# Patient Record
Sex: MALE | Race: WHITE
[De-identification: names, ages, dates, MRNs, and addresses within clinical notes are randomized per-mention and may not be internally consistent; named-entity substitution may affect disease eponyms.]

---

## 2018-09-16 NOTE — PHYS DOC
Past Medical History


Past Medical History:  Anxiety, GERD, Hypertension


Additional Past Medical Histor:  irritable bowel, blood clots (peripheral 

artery disease)


Past Surgical History:  No Surgical History


Alcohol Use:  Occasionally


Drug Use:  None





Adult General


Chief Complaint


Chief Complaint:  SWALLOWED FORIEGN BODY





HPI


HPI





Patient is a 53  year old male who presents with complaining of steak stuck in 

his throat for the last 3 days. States he had history of GERD and usually when 

he eats steak or chicken he has problems with his doctor in his throat that 

usually resolves spontaneously but since Friday  ate steak and piece of meat 

stuck in his throat  that does not go down and he is not able to swallow food 

or liquids. Patient states sometimes he is able to swallow some liquids and 

sometimes is not able to swallow and complaining of pain in his throat because 

he tried to clear his throat frequently because he has foamy mucus coming out 

of his throat. Patient denies fever and chills, chest pain, generalized 

weakness.





Review of Systems


Review of Systems





Constitutional: Denies fever or chills []


Eyes: Denies change in visual acuity, redness, or eye pain []


HENT: Denies nasal congestion or sore throat []


Respiratory: Denies cough or shortness of breath []


Cardiovascular: No additional information not addressed in HPI []


GI: Denies abdominal pain, nausea, vomiting, bloody stools or diarrhea []


: Denies dysuria or hematuria []


Musculoskeletal: Denies back pain or joint pain []


Integument: Denies rash or skin lesions []


Neurologic: Denies headache, focal weakness or sensory changes []


Endocrine: Denies polyuria or polydipsia []





All other systems were reviewed and found to be within normal limits, except as 

documented in this note.





Current Medications


Current Medications





Current Medications








 Medications


  (Trade)  Dose


 Ordered  Sig/Jayy  Start Time


 Stop Time Status Last Admin


Dose Admin


 


 Glucagon


  (Glucagen)  1 mg  1X  ONCE  9/16/18 13:00


 9/16/18 13:05 DC 9/16/18 13:30


1 MG


 


 Multi-Ingredient


 Mouthwash/Gargle


  (Gi Cocktail)  20 ml  1X  ONCE  9/16/18 12:00


 9/16/18 12:01 DC 9/16/18 12:06


20 ML


 


 Propofol  20 ml @ As


 Directed  STK-MED ONCE  9/16/18 14:42


 9/16/18 14:43 DC  





 


 Ringer's Solution  1,000 ml @ 


 50 mls/hr  Q20H  9/16/18 13:39


 9/17/18 01:38   





 


 Sodium Chloride  1,000 ml @ 


 1,000 mls/hr  1X  ONCE  9/16/18 14:45


 9/16/18 15:44   














Allergies


Allergies





Allergies








Coded Allergies Type Severity Reaction Last Updated Verified


 


  No Known Drug Allergies    9/16/18 No











Physical Exam


Physical Exam





Constitutional: Well developed, well nourished, mild distress, non-toxic 

appearance. []


HENT: Normocephalic, atraumatic, oropharynx moist, no oral exudates, nose 

normal. []


Eyes: PERRLA, EOMI, conjunctiva normal, no discharge. [] 


Neck: Normal range of motion, no tenderness, supple, no stridor. [] 


Cardiovascular:Heart rate regular rhythm, no murmur []


Lungs & Thorax:  Bilateral breath sounds clear to auscultation []


Abdomen: Bowel sounds normal, soft, no tenderness, no masses, no pulsatile 

masses. [] 


Skin: Warm, dry, no erythema, no rash. [] 


Back: No tenderness, no CVA tenderness. [] 


Extremities: No tenderness, no cyanosis, no clubbing, ROM intact, no edema. [] 


Neurologic: Alert and oriented X 3, normal motor function, normal sensory 

function, no focal deficits noted. []


Psychologic: Affect anxious, judgement normal, mood normal. []





Current Patient Data


Vital Signs





 Vital Signs








  Date Time  Temp Pulse Resp B/P (MAP) Pulse Ox O2 Delivery O2 Flow Rate FiO2


 


9/16/18 14:56 99.0 90 20  96   





 99.0       


 


9/16/18 11:45    130/85 (100)  Room Air  








Lab Values





 Laboratory Tests








Test


 9/16/18


13:12


 


White Blood Count


 14.3 x10^3/uL


(4.0-11.0)  H


 


Red Blood Count


 5.33 x10^6/uL


(4.30-5.70)


 


Hemoglobin


 16.2 g/dL


(13.0-17.5)


 


Hematocrit


 46.9 %


(39.0-53.0)


 


Mean Corpuscular Volume


 88 fL ()





 


Mean Corpuscular Hemoglobin 30 pg (25-35)  


 


Mean Corpuscular Hemoglobin


Concent 35 g/dL


(31-37)


 


Red Cell Distribution Width


 13.9 %


(11.5-14.5)


 


Platelet Count


 340 x10^3/uL


(140-400)


 


Neutrophils (%) (Auto) 83 % (31-73)  H


 


Lymphocytes (%) (Auto) 12 % (24-48)  L


 


Monocytes (%) (Auto) 5 % (0-9)  


 


Eosinophils (%) (Auto) 1 % (0-3)  


 


Basophils (%) (Auto) 1 % (0-3)  


 


Neutrophils # (Auto)


 11.8 x10^3uL


(1.8-7.7)  H


 


Lymphocytes # (Auto)


 1.7 x10^3/uL


(1.0-4.8)


 


Monocytes # (Auto)


 0.7 x10^3/uL


(0.0-1.1)


 


Eosinophils # (Auto)


 0.1 x10^3/uL


(0.0-0.7)


 


Basophils # (Auto)


 0.1 x10^3/uL


(0.0-0.2)


 


Sodium Level


 142 mmol/L


(136-145)


 


Potassium Level


 4.2 mmol/L


(3.5-5.1)


 


Chloride Level


 103 mmol/L


()


 


Carbon Dioxide Level


 24 mmol/L


(21-32)


 


Anion Gap 15 (6-14)  H


 


Blood Urea Nitrogen


 35 mg/dL


(8-26)  H


 


Creatinine


 1.1 mg/dL


(0.7-1.3)


 


Estimated GFR


(Cockcroft-Gault) 70.3  





 


Glucose Level


 111 mg/dL


(70-99)  H


 


Calcium Level


 9.5 mg/dL


(8.5-10.1)





 Laboratory Tests


9/16/18 13:12








 Laboratory Tests


9/16/18 13:12














EKG


EKG


[]





Radiology/Procedures


Radiology/Procedures


[]





Course & Med Decision Making


Course & Med Decision Making


Pertinent Labs and Imaging studies reviewed. (See chart for details)





Evaluation of patient in ER showed 53-year-old male patient with complaining of 

foreign body stuck in throat for the last 3 days and able to swallow some 

liquids sometimes. Has unremarkable physical exam except for mild anxiety. 

Patient was not able to swallow GI cocktail and soda and GI on-call Dr. Waters 

was informed at 1259 and recommended to keep patient NPO for 2 hours and she 

plans to come to emergency room for endoscopic removal of foreign body.





1500: Patient was started on IV fluids and glucagon without change of his 

condition. Labs showed marked dehydration. Anesthesiologist presented to ER at 

1440 and rated the patient. GI lab staff presented to ER and took patient to GI 

lab. Patient was discharged from emergency room care.





Dragon Disclaimer


Dragon Disclaimer


This electronic medical record was generated, in whole or in part, using a 

voice recognition dictation system.





Departure


Departure


Impression:  


 Primary Impression:  


 Esophageal foreign body


 Additional Impressions:  


 Dehydration


 Anxiety


Disposition:  01 HOME, SELF-CARE (Transferred  to GI lab at 1445)


Condition:  STABLE


Referrals:  


MARTITA REED MD (PCP)


Patient Instructions:  Swallowed Foreign Body, Adult





Additional Instructions:  


Follow-up with your GI specialist orders after endoscopy





Problem Qualifiers











ANASTASIIA SINGH MD Sep 16, 2018 13:47

## 2018-09-16 NOTE — RAD
Portable chest, 9/16/2018:

 

HISTORY: Feeling of something stuck in throat

 

The heart size is normal. The lungs are clear. There is no evidence of 

pleural fluid. No radiopaque foreign body is evident in the chest. There 

is mild bowing of the azygos esophageal stripe near the GE junction level.

A small hiatal hernia could give this appearance.

 

IMPRESSION: No acute cardiopulmonary abnormality is detected.

 

Electronically signed by: Rick Moritz, MD (9/16/2018 12:21 PM) [facility]

## 2018-09-16 NOTE — CONS
DATE OF CONSULTATION:  09/16/2018



REQUESTING PHYSICIAN:  Dr. Martita Jean.



PRIMARY CARE PHYSICIAN:  Dr. Martita Jean.



REASON FOR CONSULTATION:  Esophageal food bolus.



HISTORY OF PRESENT ILLNESS:  This is a 53-year-old gentleman who reports that he

ate steak at 8:30 on 09/14/2018.  He has been unable to swallow and manage his

saliva since then.  He has had a pain.  He was seen in the Emergency Room where

he received a GI cocktail and _____.  He was unable to tolerate these and

vomited up the liquid.  He does take omeprazole for GERD and does report

occasional dysphagia, which spontaneously resolves within 20 minutes.



PAST MEDICAL HISTORY:

1.  Hypertension.

2.  Anxiety.

3.  Hyperlipidemia.

4.  GERD.



MEDICATIONS:

1.  Xanax.

2.  Simvastatin.

3.  Lisinopril.

4.  Omeprazole.

5.  Temazepam.



SOCIAL HISTORY:  He denies tobacco or IV drug abuse.  He is a recovering

alcoholic.  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



FAMILY MEDICAL HISTORY:  No colorectal cancer.



REVIEW OF SYSTEMS:  A 13-point review of systems was done.  It is positive as

per HPI and otherwise negative.



PHYSICAL EXAMINATION:

VITAL SIGNS:  His vital signs are stable, he is afebrile.

GENERAL:  He is a well-developed, well-nourished  male, in no apparent

distress.

HEENT:  He has got poor dentition.  His oropharynx is clear.

CARDIOVASCULAR:  S1, S2.

LUNGS:  Clear.

ABDOMEN:  Normoactive bowel sounds, soft, nontender, nondistended.

EXTREMITIES:  No edema.

NEUROLOGIC:  Awake, alert, oriented x 3.



ASSESSMENT AND PLAN:

1.  Esophageal food bolus:  The risks and benefits of upper endoscopy including

bleeding, perforation, non-diagnosis and sedation have been explained and he has

agreed to proceed.

2.  Colorectal cancer screening:  He denies any family history of colon cancer

and has not had a colonoscopy.



Thank you for allowing me to participate in the care of this patient.

 



______________________________

BLAIR ROSS MD



DR:  BRIE/greg  JOB#:  [id number] / [id number]

DD:  09/16/2018 15:27  DT:  09/16/2018 22:11



MARTITA Mullins MD

## 2018-11-27 NOTE — PHYS DOC
Past Medical History


Past Medical History:  No Pertinent History


Additional Past Medical Histor:  irritable bowel, blood clots (peripheral 

artery disease)


Past Surgical History:  No Surgical History


Alcohol Use:  None


Drug Use:  None





Adult General


Chief Complaint


Chief Complaint:  MECHANICAL FALL





HPI


HPI





Patient is a 53  year old [f__sex] who presents with []





Review of Systems


Review of Systems





Constitutional: Denies fever or chills []


Eyes: Denies change in visual acuity, redness, or eye pain []


HENT: Denies nasal congestion or sore throat []


Respiratory: Denies cough or shortness of breath []


Cardiovascular: No additional information not addressed in HPI []


GI: Denies abdominal pain, nausea, vomiting, bloody stools or diarrhea []


: Denies dysuria or hematuria []


Musculoskeletal: Denies back pain or joint pain []


Integument: Denies rash or skin lesions []


Neurologic: Denies headache, focal weakness or sensory changes []


Endocrine: Denies polyuria or polydipsia []





All other systems were reviewed and found to be within normal limits, except as 

documented in this note.





Current Medications


Current Medications





Current Medications








 Medications


  (Trade)  Dose


 Ordered  Sig/Jayy  Start Time


 Stop Time Status Last Admin


Dose Admin


 


 Etomidate


  (Amidate)  10 mg  1X  ONCE  11/27/18 21:00


 11/27/18 21:01 DC 11/27/18 21:52


10 MG


 


 Fentanyl Citrate


  (Fentanyl 2ml


 Vial)  100 mcg  1X  ONCE  11/27/18 21:00


 11/27/18 21:01 DC 11/27/18 21:53


100 MCG


 


 Ketamine HCl  100 mg  1X  ONCE  11/27/18 22:30


 11/27/18 22:31   





 


 Oxycodone/


 Acetaminophen


  (Percocet 5/325)  1 tab  1X  ONCE  11/27/18 23:00


 11/27/18 23:01   





 


 Sodium Chloride  1,000 ml @ 


 1,000 mls/hr  1X  ONCE  11/27/18 20:45


 11/27/18 21:44 DC 11/27/18 20:44


1,000 MLS/HR











Allergies


Allergies





Allergies








Coded Allergies Type Severity Reaction Last Updated Verified


 


  No Known Drug Allergies    9/16/18 No











Physical Exam


Physical Exam





Constitutional: Well developed, well nourished, no acute distress, non-toxic 

appearance. []


HENT: Normocephalic, atraumatic, bilateral external ears normal, oropharynx 

moist, no oral exudates, nose normal. []


Eyes: PERRLA, EOMI, conjunctiva normal, no discharge. [] 


Neck: Normal range of motion, no tenderness, supple, no stridor. [] 


Cardiovascular:Heart rate regular rhythm, no murmur []


Lungs & Thorax:  Bilateral breath sounds clear to auscultation []


Abdomen: Bowel sounds normal, soft, no tenderness, no masses, no pulsatile 

masses. [] 


Skin: Warm, dry, no erythema, no rash. [] 


Back: No tenderness, no CVA tenderness. [] 


Extremities: No tenderness, no cyanosis, no clubbing, ROM intact, no edema. [] 


Neurologic: Alert and oriented X 3, normal motor function, normal sensory 

function, no focal deficits noted. []


Psychologic: Affect normal, judgement normal, mood normal. []





Current Patient Data


Vital Signs





 Vital Signs








  Date Time  Temp Pulse Resp B/P (MAP) Pulse Ox O2 Delivery O2 Flow Rate FiO2


 


11/27/18 21:53   16  96 Room Air  


 


11/27/18 20:30 98.6 74  149/84 (105)    





 98.6       











EKG


EKG


[]





Radiology/Procedures


Radiology/Procedures


[]





Course & Med Decision Making


Course & Med Decision Making


Pertinent Labs and Imaging studies reviewed. (See chart for details)





[]





Dragon Disclaimer


Dragon Disclaimer


This electronic medical record was generated, in whole or in part, using a 

voice recognition dictation system.





Departure


Departure


Impression:  


 Primary Impression:  


 Dislocation of elbow, right, closed


Disposition:  01 HOME, SELF-CARE


Condition:  STABLE


Referrals:  


MARTITA REED MD (PCP)








LEILA MOLINA II, MD


Patient Instructions:  Elbow Dislocation, Sedation or General Anesthesia, Adult

, Care After, Sedation, Moderate, Adult


Scripts


Oxycodone/Apap 5-325 (PERCOCET 5-325 MG TABLET) 1 Each Tablet


0.5 TAB PO PRN Q6HRS PRN for PAIN, #14 TAB 0 Refills


   Prov: GERI COLBY          11/27/18





MODERATE SEDATION ASSESSMENT


RISKS/ALTERNATIVES


Risks/Alternatives


Risks and alternatives of this type of sedation and procedure discussed with:





H & P ON CHART


H & P


H & P on chart and reviewed for co-morbid conditions and appropriate labs.





MEDS/ALLERGIES REVIEWED


Meds/Allergies Reviewed


Medications and Allergies including time and route of recently administered 

narcotics and sedatives.





AIRWAY ASSESSMENT


Airway Assessment


Airway patency, oral function limitations, presence  of caps, crowns, dentures, 

partials, and ability to extend neck assessed.





Problem Qualifiers








 Primary Impression:  


 Dislocation of elbow, right, closed


 Encounter type:  initial encounter  Qualified Codes:  S53.104A - Unspecified 

dislocation of right ulnohumeral joint, initial encounter








GERI COLBY DO Nov 27, 2018 22:24

## 2018-11-28 NOTE — RAD
AP and lateral right elbow radiographs 11/27/2018

 

CLINICAL HISTORY: Fall with injury to the right elbow.

 

The radial head and proximal ulna are dislocated posteriorly. There 

appears to be a fracture of the coronoid process along with the right 

radial head. There is a large right elbow joint effusion.

 

IMPRESSION: Posterior dislocation of the right elbow with suspected 

fractures involving the coronoid process of the right ulna and the right 

radial head.

 

Electronically signed by: Dominick Manzo MD (11/28/2018 12:36 AM) [facility]

## 2018-11-28 NOTE — RAD
Two-view right elbow radiographs 11/27/2018

 

CLINICAL HISTORY: Post reduction of a dislocation of the right elbow.

 

AP and lateral digital radiographs of the right elbow were obtained. 

Comparison study is dated earlier same day. An external cast has been 

placed around the right elbow. The posterior dislocation of the right 

elbow has been reduced. A nondisplaced fracture of the right radial head 

is again seen.

 

IMPRESSION: Postreduction of the right elbow dislocation.

 

Electronically signed by: Dominick Manzo MD (11/28/2018 1:54 AM) [facility]

## 2019-01-15 NOTE — RAD
EXAM: CT RIGHT ELBOW

 

DATE: 1/15/2019 9:13 AM

 

COMPARISON: No prior

 

INDICATION:  History of right elbow dislocation post fall.

 

TECHNIQUE: CT of the right elbow was performed without IV contrast. Axial 

coronal and sagittal reformatted images were generated. In addition 3-D 

reformat images were also generated.

 

FINDINGS: 

Of note, given positioning constraints, evaluation is limited given 

associated quantum mottle.

 

There is a nondisplaced fracture of the radial head without significant 

cortical offset/irregularity or gap at the articular surface. Mild contour

abnormality of the coronoid process suggesting nondisplaced coronoid 

process fracture.

 

No elbow joint effusion. Normal muscle signal and bulk.

 

2-D AND 3-D reformat images redemonstrate  normal alignment of the elbow 

joint.

 

IMPRESSION:

1.  Nondisplaced fractures of the radial head and coronoid process

2.  Normal alignment of the radiohumeral and ulnohumeral joints.

 

Electronically signed by: Alonzo Smith MD (1/15/2019 2:46 PM) [facility]

## 2021-04-05 ENCOUNTER — HOSPITAL ENCOUNTER (OUTPATIENT)
Dept: HOSPITAL 61 - ER | Age: 56
Setting detail: OBSERVATION
LOS: 2 days | Discharge: HOME | End: 2021-04-07
Attending: FAMILY MEDICINE | Admitting: FAMILY MEDICINE
Payer: COMMERCIAL

## 2021-04-05 VITALS — HEIGHT: 74 IN | BODY MASS INDEX: 22.95 KG/M2 | WEIGHT: 178.79 LBS

## 2021-04-05 VITALS — DIASTOLIC BLOOD PRESSURE: 80 MMHG | SYSTOLIC BLOOD PRESSURE: 145 MMHG

## 2021-04-05 VITALS — SYSTOLIC BLOOD PRESSURE: 151 MMHG | DIASTOLIC BLOOD PRESSURE: 97 MMHG

## 2021-04-05 DIAGNOSIS — F10.129: ICD-10-CM

## 2021-04-05 DIAGNOSIS — I26.93: ICD-10-CM

## 2021-04-05 DIAGNOSIS — E78.00: ICD-10-CM

## 2021-04-05 DIAGNOSIS — I82.411: Primary | ICD-10-CM

## 2021-04-05 DIAGNOSIS — I10: ICD-10-CM

## 2021-04-05 DIAGNOSIS — F41.9: ICD-10-CM

## 2021-04-05 DIAGNOSIS — K76.0: ICD-10-CM

## 2021-04-05 DIAGNOSIS — Z87.891: ICD-10-CM

## 2021-04-05 DIAGNOSIS — I73.9: ICD-10-CM

## 2021-04-05 LAB
ALBUMIN SERPL-MCNC: 3.5 G/DL (ref 3.4–5)
ALBUMIN/GLOB SERPL: 1.1 {RATIO} (ref 1–1.7)
ALP SERPL-CCNC: 55 U/L (ref 46–116)
ALT SERPL-CCNC: 42 U/L (ref 16–63)
ANION GAP SERPL CALC-SCNC: 10 MMOL/L (ref 6–14)
AST SERPL-CCNC: 69 U/L (ref 15–37)
BASOPHILS # BLD AUTO: 0.1 X10^3/UL (ref 0–0.2)
BASOPHILS NFR BLD: 1 % (ref 0–3)
BILIRUB SERPL-MCNC: 1.9 MG/DL (ref 0.2–1)
BUN SERPL-MCNC: 10 MG/DL (ref 8–26)
BUN/CREAT SERPL: 13 (ref 6–20)
CALCIUM SERPL-MCNC: 8.5 MG/DL (ref 8.5–10.1)
CHLORIDE SERPL-SCNC: 93 MMOL/L (ref 98–107)
CO2 SERPL-SCNC: 31 MMOL/L (ref 21–32)
CREAT SERPL-MCNC: 0.8 MG/DL (ref 0.7–1.3)
EOSINOPHIL NFR BLD: 0.1 X10^3/UL (ref 0–0.7)
EOSINOPHIL NFR BLD: 1 % (ref 0–3)
ERYTHROCYTE [DISTWIDTH] IN BLOOD BY AUTOMATED COUNT: 13.8 % (ref 11.5–14.5)
GFR SERPLBLD BASED ON 1.73 SQ M-ARVRAT: 100.7 ML/MIN
GLUCOSE SERPL-MCNC: 127 MG/DL (ref 70–99)
HCT VFR BLD CALC: 43.6 % (ref 39–53)
HGB BLD-MCNC: 15.1 G/DL (ref 13–17.5)
LYMPHOCYTES # BLD: 1.5 X10^3/UL (ref 1–4.8)
LYMPHOCYTES NFR BLD AUTO: 11 % (ref 24–48)
MCH RBC QN AUTO: 30 PG (ref 25–35)
MCHC RBC AUTO-ENTMCNC: 35 G/DL (ref 31–37)
MCV RBC AUTO: 87 FL (ref 79–100)
MONO #: 1.1 X10^3/UL (ref 0–1.1)
MONOCYTES NFR BLD: 8 % (ref 0–9)
NEUT #: 11.1 X10^3/UL (ref 1.8–7.7)
NEUTROPHILS NFR BLD AUTO: 80 % (ref 31–73)
PLATELET # BLD AUTO: 100 X10^3/UL (ref 140–400)
POTASSIUM SERPL-SCNC: 3.2 MMOL/L (ref 3.5–5.1)
PROT SERPL-MCNC: 6.7 G/DL (ref 6.4–8.2)
PROTHROMBIN TIME: 14.6 SEC (ref 11.7–14)
RBC # BLD AUTO: 5.04 X10^6/UL (ref 4.3–5.7)
SODIUM SERPL-SCNC: 134 MMOL/L (ref 136–145)
WBC # BLD AUTO: 13.9 X10^3/UL (ref 4–11)

## 2021-04-05 PROCEDURE — 96372 THER/PROPH/DIAG INJ SC/IM: CPT

## 2021-04-05 PROCEDURE — G0379 DIRECT REFER HOSPITAL OBSERV: HCPCS

## 2021-04-05 PROCEDURE — 85610 PROTHROMBIN TIME: CPT

## 2021-04-05 PROCEDURE — 99285 EMERGENCY DEPT VISIT HI MDM: CPT

## 2021-04-05 PROCEDURE — 71275 CT ANGIOGRAPHY CHEST: CPT

## 2021-04-05 PROCEDURE — 93971 EXTREMITY STUDY: CPT

## 2021-04-05 PROCEDURE — G0378 HOSPITAL OBSERVATION PER HR: HCPCS

## 2021-04-05 PROCEDURE — 36415 COLL VENOUS BLD VENIPUNCTURE: CPT

## 2021-04-05 PROCEDURE — 80053 COMPREHEN METABOLIC PANEL: CPT

## 2021-04-05 PROCEDURE — 85025 COMPLETE CBC W/AUTO DIFF WBC: CPT

## 2021-04-05 RX ADMIN — HYDROCODONE BITARTRATE AND ACETAMINOPHEN PRN TAB: 5; 325 TABLET ORAL at 21:35

## 2021-04-05 RX ADMIN — TEMAZEPAM PRN MG: 15 CAPSULE ORAL at 21:34

## 2021-04-05 NOTE — ED.ADGEN
Past Medical History


Past Medical History:  Anxiety, GERD, High Cholesterol, Hypertension, Other


Additional Past Medical Histor:  irritable bowel, blood clots (peripheral artery

disease), INSOMNIA


Past Surgical History:  Angioplasty, Other


Smoking Status:  Former Smoker


Alcohol Use:  None


Drug Use:  None





General Adult


EDM:


Chief Complaint:  LOWER EXT PAIN





HPI:


HPI:





Patient is a 55  year old male who presents emergency department with complaints

of right lower extremity swelling and pain for the last 2 days.  Patient reports

that he is have a history of having a blood clot in his toe once and he almost 

lost his toe to the blood clot so he feels very anxious about being here today. 

Patient denies any use of anticoagulants.  He is any erythema, warmth, or injury

to his right leg.  Numbness, tingling, or decreased sensation to the affected 

extremity.  He denies any chest pain, shortness of breath, fever, cough, nausea,

vomiting, diarrhea, abdominal pain, or rash.  Patient denies any known exposure 

to COVID-19.  He currently rates pain 3 out of 10 on pain scale he denies any 

alleviating or exacerbating factors.





Review of Systems:


Review of Systems:


Complete ROS is negative unless otherwise noted in HPI.





Current Medications:





Current Medications








 Medications


  (Trade)  Dose


 Ordered  Sig/[facility]  Start Time


 Stop Time Status Last Admin


Dose Admin


 


 Alprazolam


  (Xanax)  1 mg  1X  ONCE  4/5/21 17:45


 4/5/21 17:46 DC 4/5/21 18:09


1 MG


 


 Enoxaparin Sodium


  (Lovenox 100mg


 Syringe)  90 mg  Q12HR  4/5/21 18:00


 4/5/21 20:30 DC 4/5/21 18:09


90 MG


 


 Enoxaparin Sodium


  (Lovenox Per


 Pharmacy


 Treatment Dosing)  1 each  PRN DAILY  PRN  4/5/21 17:45


 4/5/21 20:29 DC  





 


 Iohexol


  (Omnipaque 350


 Mg/ml)  100 ml  1X  ONCE  4/5/21 17:30


 4/5/21 17:31 DC 4/5/21 18:42


100 ML











Allergies:


Allergies:





Allergies








Coded Allergies Type Severity Reaction Last Updated Verified


 


  No Known Drug Allergies    9/16/18 No











Physical Exam:


PE:


See Above


Constitutional: Well developed, well nourished, no acute distress, non-toxic 

appearance, anxious. []


HENT: Normocephalic, atraumatic, bilateral external ears normal, nose normal. []


Eyes: PERRLA, EOMI, conjunctiva normal, no discharge. [] 


Neck: Normal range of motion, no stridor. [] 


Cardiovascular:Heart rate regular rhythm


Lungs & Thorax:  Respirations even and unlabored, no retractions, no respiratory

 distress


Abdomen: soft, no tenderness


Skin: Warm, dry, no erythema, no rash. [] 


Extremities: RLE: 2+ pedal pulse 2+ posterior tibial pulse, no bony tenderness, 

tenderness to palpation of the calf, cap refill less than 2 seconds, no cyan

osis, ROM intact, 1+ edema. [] 


Neurologic: Alert and oriented X 3, no focal deficits noted. []


Psychologic: Affect anxious, judgement normal, mood normal. []





Current Patient Data:


Labs:





                                Laboratory Tests








Test


 4/5/21


17:58


 


White Blood Count


 13.9 x10^3/uL


(4.0-11.0)  H


 


Red Blood Count


 5.04 x10^6/uL


(4.30-5.70)


 


Hemoglobin


 15.1 g/dL


(13.0-17.5)


 


Hematocrit


 43.6 %


(39.0-53.0)


 


Mean Corpuscular Volume


 87 fL ()





 


Mean Corpuscular Hemoglobin 30 pg (25-35)  


 


Mean Corpuscular Hemoglobin


Concent 35 g/dL


(31-37)


 


Red Cell Distribution Width


 13.8 %


(11.5-14.5)


 


Platelet Count


 100 x10^3/uL


(140-400)  L


 


Neutrophils (%) (Auto) 80 % (31-73)  H


 


Lymphocytes (%) (Auto) 11 % (24-48)  L


 


Monocytes (%) (Auto) 8 % (0-9)  


 


Eosinophils (%) (Auto) 1 % (0-3)  


 


Basophils (%) (Auto) 1 % (0-3)  


 


Neutrophils # (Auto)


 11.1 x10^3/uL


(1.8-7.7)  H


 


Lymphocytes # (Auto)


 1.5 x10^3/uL


(1.0-4.8)


 


Monocytes # (Auto)


 1.1 x10^3/uL


(0.0-1.1)


 


Eosinophils # (Auto)


 0.1 x10^3/uL


(0.0-0.7)


 


Basophils # (Auto)


 0.1 x10^3/uL


(0.0-0.2)


 


Prothrombin Time


 14.6 SEC


(11.7-14.0)  H


 


Prothrombin Time INR


 1.2 (0.8-1.1)


H


 


Sodium Level


 134 mmol/L


(136-145)  L


 


Potassium Level


 3.2 mmol/L


(3.5-5.1)  L


 


Chloride Level


 93 mmol/L


()  L


 


Carbon Dioxide Level


 31 mmol/L


(21-32)


 


Anion Gap 10 (6-14)  


 


Blood Urea Nitrogen


 10 mg/dL


(8-26)


 


Creatinine


 0.8 mg/dL


(0.7-1.3)


 


Estimated GFR


(Cockcroft-Gault) 100.7  





 


BUN/Creatinine Ratio 13 (6-20)  


 


Glucose Level


 127 mg/dL


(70-99)  H


 


Calcium Level


 8.5 mg/dL


(8.5-10.1)


 


Total Bilirubin


 1.9 mg/dL


(0.2-1.0)  H


 


Aspartate Amino Transferase


(AST) 69 U/L (15-37)


H


 


Alanine Aminotransferase (ALT)


 42 U/L (16-63)





 


Alkaline Phosphatase


 55 U/L


()


 


Total Protein


 6.7 g/dL


(6.4-8.2)


 


Albumin


 3.5 g/dL


(3.4-5.0)


 


Albumin/Globulin Ratio 1.1 (1.0-1.7)  





                                Laboratory Tests


4/5/21 17:58








                                Laboratory Tests


4/5/21 17:58











Vital Signs:





                                   Vital Signs








  Date Time  Temp Pulse Resp B/P (MAP) Pulse Ox O2 Delivery O2 Flow Rate FiO2


 


4/5/21 18:00  100  142/97 (112) 97 Room Air  


 


4/5/21 15:47   12     











EKG:


EKG:


[]





Heart Score:


C/O Chest Pain:  No


Risk Scores:


Score 0 - 3:  2.5% MACE over next 6 weeks - Discharge Home


Score 4 - 6:  20.3% MACE over next 6 weeks - Admit for Clinical Observation


Score 7 - 10:  72.7% MACE over next 6 weeks - Early Invasive Strategies





Radiology/Procedures:


Radiology/Procedures:


PROCEDURE: VENOUS LOWER EXTREMITY RIGHT





EXAMINATION:  US DPLX VENOUS EXTREMITY LOWER RT, 4/5/2021 4:48 PM





CLINICAL INDICATION:  Right calf pain and swelling, history of blood clot and





COMPARISON: None





PROCEDURE: Multiple grayscale, color Doppler and spectral Doppler sonographic 

images of the right lower extremity were obtained.





FINDINGS: There is occlusive thrombus in the right distal superficial femoral 

vein, popliteal vein, posterior tibial veins, and peroneal veins. The right 

common femoral, greater saphenous, and proximal to mid superficial femoral 

veinsare patent.





IMPRESSION: Positive exam for occlusive acute deep venous thrombosis in the 

right lower extremity from the distal superficial femoral vein through the calf 

veins.








**********FOR INTERNAL CODING PURPOSES**********





Critical result:





Findings discussed with the ordering physician on 4/5/2021 5:15 PM.





RESULT CODE: (C)  











PROCEDURE: CT ANGIOGRAPHY CHEST





Exam: CT of chest with contrast





INDICATION: DVT in the right lower extremity





TECHNIQUE: Sequential axial images through the chest obtained following the 

administration 100 mL of Omni 350 IV contrast. Sagittal and coronal reformatted 

images were reconstructed from the axial data and reviewed. 3-D reformatted 

images were reconstructed from the axial data and reviewed.





Comparisons: Ultrasound same day





FINDINGS:


Visualized portions of the thyroid are unremarkable. No enlarged mediastinal 

lymph nodes.





Heart size is normal. No pericardial effusion. Thoracic aorta has a normal 

course and caliber. Pulmonary artery is not enlarged. There is a pulmonary 

embolus noted within a subsegmental branch of the right lower lobe.





Airways are patent. No consolidation or pneumothorax. No suspicious lung 

nodules.





No pleural effusion or thickening.





Diffuse hepatic steatosis.





No suspicious osseous lesions or acute fractures.





IMPRESSION:


1.  Subsegmental pulmonary embolus within the right lower lobe. No evidence for 

right heart strain.


2.  Diffuse hepatic steatosis.








Exposure: One or more of the following in the visualized dose reduction 

techniques were utilized for this examination:


1.  Automated exposure control


2.  Adjustment of the MA and/or KV according to patient size


3.  Use of iterative of reconstructive technique





Electronically signed by: Devyn Sebastian MD (4/5/2021 7:07 PM) [facility]











  








[]





Course & Med Decision Making:


Course & Med Decision Making


Pertinent Labs and Imaging studies reviewed. (See chart for details)


[id number] with Dr. Reed who is the admitting physician, and care was assumed

 following discussion of patient.  Will admit patient as observation status for 

DVT of the right lower extremity.  I advised him of pending CT chest and labs 

that were ordered.


Patient's vital signs stable. Patient remains afebrile, appears nontoxic, 

respirations even and unlabored. Patient will be admitted to the medical 

surgical floor. Patient's case and plan of care also discussed with Dr. Acosta


[]





Jordyn Disclaimer:


Dragon Disclaimer:


This electronic medical record was generated, in whole or in part, using a voice

 recognition dictation system.





Departure


Departure


Referrals:  


MARTITA REED MD (PCP)











JIM MOORE        Apr 5, 2021 18:18

## 2021-04-05 NOTE — RAD
EXAMINATION:  US DPLX VENOUS EXTREMITY LOWER RT, 4/5/2021 4:48 PM



CLINICAL INDICATION:  Right calf pain and swelling, history of blood clot and



COMPARISON: None



PROCEDURE: Multiple grayscale, color Doppler and spectral Doppler sonographic images of the right low
er extremity were obtained.



FINDINGS: There is occlusive thrombus in the right distal superficial femoral vein, popliteal vein, p
osterior tibial veins, and peroneal veins. The right common femoral, greater saphenous, and proximal 
to mid superficial femoral veinsare patent.



IMPRESSION: Positive exam for occlusive acute deep venous thrombosis in the right lower extremity fro
m the distal superficial femoral vein through the calf veins.





**********FOR INTERNAL CODING PURPOSES**********



Critical result:



Findings discussed with the ordering physician on 4/5/2021 5:15 PM.



RESULT CODE: (C)  



  







Electronically signed by: Madeline Mosquera MD (4/5/2021 5:16 PM) [id number]

## 2021-04-05 NOTE — RAD
Exam: CT of chest with contrast



INDICATION: DVT in the right lower extremity



TECHNIQUE: Sequential axial images through the chest obtained following the administration 100 mL of 
Omni 350 IV contrast. Sagittal and coronal reformatted images were reconstructed from the axial data 
and reviewed. 3-D reformatted images were reconstructed from the axial data and reviewed.



Comparisons: Ultrasound same day



FINDINGS:

Visualized portions of the thyroid are unremarkable. No enlarged mediastinal lymph nodes.



Heart size is normal. No pericardial effusion. Thoracic aorta has a normal course and caliber. Pulmon
hayley artery is not enlarged. There is a pulmonary embolus noted within a subsegmental branch of the ri
ght lower lobe.



Airways are patent. No consolidation or pneumothorax. No suspicious lung nodules.



No pleural effusion or thickening.



Diffuse hepatic steatosis.



No suspicious osseous lesions or acute fractures.



IMPRESSION:

1.  Subsegmental pulmonary embolus within the right lower lobe. No evidence for right heart strain.

2.  Diffuse hepatic steatosis.





Exposure: One or more of the following in the visualized dose reduction techniques were utilized for 
this examination:

1.  Automated exposure control

2.  Adjustment of the MA and/or KV according to patient size

3.  Use of iterative of reconstructive technique



Electronically signed by: Devyn Sebastian MD (4/5/2021 7:07 PM) [facility]

## 2021-04-05 NOTE — NUR
ADMIT NOTE

The patient, MORRIS MARIA, 56 y/o, M admitted by MARTITA REED MD, was given written 
information regarding hospital policies, unit procedures and contact persons.  

Patient orientated to room, plan of care discussed and admit packet reviewed.

Allergies, home medications, and preferred pharmacy verified with patient. 

MD notified of patient's admission to floor and orders received.

Patient remains in bed, bed in lowest/locked position, and call light within reach; no other 
needs voiced by patient at this time.

## 2021-04-06 VITALS — DIASTOLIC BLOOD PRESSURE: 81 MMHG | SYSTOLIC BLOOD PRESSURE: 143 MMHG

## 2021-04-06 VITALS — SYSTOLIC BLOOD PRESSURE: 136 MMHG | DIASTOLIC BLOOD PRESSURE: 94 MMHG

## 2021-04-06 VITALS — DIASTOLIC BLOOD PRESSURE: 93 MMHG | SYSTOLIC BLOOD PRESSURE: 158 MMHG

## 2021-04-06 VITALS — DIASTOLIC BLOOD PRESSURE: 90 MMHG | SYSTOLIC BLOOD PRESSURE: 146 MMHG

## 2021-04-06 VITALS — SYSTOLIC BLOOD PRESSURE: 122 MMHG | DIASTOLIC BLOOD PRESSURE: 86 MMHG

## 2021-04-06 VITALS — DIASTOLIC BLOOD PRESSURE: 65 MMHG | SYSTOLIC BLOOD PRESSURE: 124 MMHG

## 2021-04-06 RX ADMIN — POTASSIUM CHLORIDE SCH MEQ: 750 TABLET, FILM COATED, EXTENDED RELEASE ORAL at 17:10

## 2021-04-06 RX ADMIN — POTASSIUM CHLORIDE SCH MEQ: 750 TABLET, FILM COATED, EXTENDED RELEASE ORAL at 09:12

## 2021-04-06 RX ADMIN — HYDROCODONE BITARTRATE AND ACETAMINOPHEN PRN TAB: 5; 325 TABLET ORAL at 20:14

## 2021-04-06 RX ADMIN — FLUTICASONE PROPIONATE SCH SPRAY: 50 SPRAY, METERED NASAL at 09:00

## 2021-04-06 RX ADMIN — RIVAROXABAN SCH MG: 15 TABLET, FILM COATED ORAL at 09:13

## 2021-04-06 RX ADMIN — ATORVASTATIN CALCIUM SCH MG: 10 TABLET, FILM COATED ORAL at 09:12

## 2021-04-06 RX ADMIN — HYDROCODONE BITARTRATE AND ACETAMINOPHEN PRN TAB: 5; 325 TABLET ORAL at 09:18

## 2021-04-06 RX ADMIN — TEMAZEPAM PRN MG: 15 CAPSULE ORAL at 21:45

## 2021-04-06 RX ADMIN — RIVAROXABAN SCH MG: 15 TABLET, FILM COATED ORAL at 17:10

## 2021-04-06 RX ADMIN — HYDROCODONE BITARTRATE AND ACETAMINOPHEN PRN TAB: 5; 325 TABLET ORAL at 14:00

## 2021-04-06 RX ADMIN — LOSARTAN POTASSIUM SCH MG: 50 TABLET ORAL at 09:15

## 2021-04-06 RX ADMIN — PANTOPRAZOLE SODIUM SCH MG: 40 TABLET, DELAYED RELEASE ORAL at 09:12

## 2021-04-06 NOTE — PDOC
Provider Note


Date of Service:


DATE: 4/6/21 


TIME: 08:43


Provider Note


[id number]





Justifications for Admission


Other Justification














MARTITA REED MD              Apr 6, 2021 08:46

## 2021-04-06 NOTE — NUR
This SW asked to compete advanced directive paperwork. SW met with patient. Pt a/o and able 
to make needs known. Pt requesting to list his mother Renee Baron ([phone]) as his 
primary agent for the power of  for HC. Pt listed his brother Werner Baron 
([phone]) as his first alternate agent. POA for HC form notarized and provided to pt 
with copies. Copy placed on pt's chart. No further needs from this SW.

## 2021-04-06 NOTE — HP
ADMIT DATE:  04/05/2021



CHIEF COMPLAINT:  Swollen right leg.



HISTORY OF PRESENT ILLNESS:  A 55-year-old white male with hypertension and

chronic anxiety and some history of alcohol abuse, came in with 2-3 days of

right leg pain and then noted swelling in his foot and lower leg and was

suspicious he had a blood clot present.  He denies chest pain, hemoptysis,

cough, fever, chills, injury or recent immobilization.  DVT study showed

evidence of diffuse occlusive thrombophlebitis of the right lower extremity from

the thigh down to the ankle and also a small right-sided pulmonary embolus

present on CT scan.  He was started on Lovenox and is taking Xarelto this

morning and feeling somewhat better in the right leg.  He has never had any

previous DVT problems.



PAST MEDICAL HISTORY: 

Medications:  Noted per the chart.  He has a history of alcohol abuse and

anxiety, He takes Xanax and temazepam.



Allergies:  No allergies are known.



SOCIAL HISTORY:  Unemployed, nonsmoker.  Denies much alcohol use.  Physically

active.



FAMILY HISTORY:  Unremarkable.



REVIEW OF SYSTEMS:  Unremarkable.



OBJECTIVE:

ENT:  All within normal limits.

NECK:  No nodes, masses, or bruits.

LUNGS:  Clear without tachypnea or rubs.

CARDIOVASCULAR:  Regular rate.  Heart rate about 100.  No murmur.

ABDOMEN:  Soft, benign and nontender.

EXTREMITIES:  He has 1+ pretibial edema and the right dorsal foot is mildly

swollen.  Calf and leg are not really tender.  Rest of the exam is normal with

good pedal pulses bilaterally.

NEUROLOGIC:  Anxious, nonfocal.  No focal findings are noted.



ASSESSMENT:  Right leg deep vein thrombosis with secondary right lower lobe

pulmonary embolus, history of hypertension and chronic anxiety disorder and some

history of alcohol abuse as well.



PLAN:  Continue Xarelto as ordered and we will only switch to warfarin and

Lovenox if his insurance makes his Xarelto unaffordable as an outpatient.  He

will check his pharmacy later today regarding that.

 



______________________________

MARTITA REED MD



DR:  MARIBEL/greg  JOB#:  [id number] / [id number]

DD:  04/06/2021 08:46  DT:  04/06/2021 09:19

## 2021-04-06 NOTE — NUR
MOSES following. Discussed with RN, pt from home alone, room air, cardiac diet. Pt needing to 
find out if he can afford the Xarelto prescription, has spoken to his insurance. [facility] provided 
Xarelto assistance program information to pt. Rosemary LOPES met with pt to complete DPOA 
paperwork. Pt denied any further SW needs. RN notified. [facility] will continue to follow.

## 2021-04-07 VITALS — SYSTOLIC BLOOD PRESSURE: 129 MMHG | DIASTOLIC BLOOD PRESSURE: 86 MMHG

## 2021-04-07 VITALS — DIASTOLIC BLOOD PRESSURE: 72 MMHG | SYSTOLIC BLOOD PRESSURE: 106 MMHG

## 2021-04-07 VITALS — DIASTOLIC BLOOD PRESSURE: 88 MMHG | SYSTOLIC BLOOD PRESSURE: 132 MMHG

## 2021-04-07 RX ADMIN — PANTOPRAZOLE SODIUM SCH MG: 40 TABLET, DELAYED RELEASE ORAL at 06:22

## 2021-04-07 RX ADMIN — LOSARTAN POTASSIUM SCH MG: 50 TABLET ORAL at 08:23

## 2021-04-07 RX ADMIN — ATORVASTATIN CALCIUM SCH MG: 10 TABLET, FILM COATED ORAL at 08:22

## 2021-04-07 RX ADMIN — HYDROCODONE BITARTRATE AND ACETAMINOPHEN PRN TAB: 5; 325 TABLET ORAL at 06:23

## 2021-04-07 RX ADMIN — POTASSIUM CHLORIDE SCH MEQ: 750 TABLET, FILM COATED, EXTENDED RELEASE ORAL at 08:23

## 2021-04-07 RX ADMIN — RIVAROXABAN SCH MG: 15 TABLET, FILM COATED ORAL at 08:23

## 2021-04-07 RX ADMIN — FLUTICASONE PROPIONATE SCH SPRAY: 50 SPRAY, METERED NASAL at 09:00

## 2021-04-07 NOTE — NUR
SW following. Discussed with RN, pt from home alone, room air, cardiac diet. Discharge order 
for home with self care. Pt provided with Xarelto program and DPOA paperwork completed. No 
further SW needs.

## 2021-04-07 NOTE — DS
DATE OF DISCHARGE:  04/07/2021



HOSPITAL SUMMARY:  A 55-year-old white male came in with diffuse pain and

swelling in his right leg and DVT in the right lower extremity, found by Doppler

study.  He also had a small DVT in the right lower lobe per CT scan, but rest of

the laboratory studies were unremarkable.  He was treated with a single dose of

Lovenox and then oral Xarelto and is feeling better and able to be followed as

an outpatient as he has learned that his insurance will cover Xarelto.



FINAL DIAGNOSES:

1.  Deep vein thrombosis, acute right lower extremity.

2.  Pulmonary embolus, right lower lobe secondary to DVT.



OPERATIONS, PROCEDURES, COMPLICATIONS, CONSULTATIONS:  None.



DISPOSITION:  Xarelto 15 mg twice a day for 20 days and then 20 mg daily after

that for 6 months, which will be followed by hypercoagulability profile.  He has

been sedentary at home for the last several weeks and likely this is the source

of the DVT.  Home meds remain the same.  Activity and diet as tolerated.



PROGNOSIS:  Good.

 



______________________________

MARTITA REED MD



DR:  MARIBEL/greg  JOB#:  [id number] / [id number]

DD:  04/07/2021 08:40  DT:  04/07/2021 09:25

## 2021-04-07 NOTE — NUR
Pt discharged home with self care. Discharge instructions and prescriptions discussed. Pt 
verbalized understanding. IV removed. Pt assisted to wheelchair and was secured in car with 
mom.

## 2021-04-07 NOTE — PDOC
Provider Note


Date of Service:


DATE: 4/7/21 


TIME: 08:39


Provider Note


[id number]





Justifications for Admission


Other Justification














MARTITA REED MD              Apr 7, 2021 08:41